# Patient Record
(demographics unavailable — no encounter records)

---

## 2024-11-01 NOTE — ASSESSMENT
[FreeTextEntry1] : R RC tendonitis/impingement. Ice. R SA injection 9/20/24.  Continue PT new rx provided  Mobic daily prn. (does not need renewal)  Recommend pain management for c-spine.  RTO 8 weeks.   .

## 2024-11-01 NOTE — PHYSICAL EXAM
[Right] : right shoulder [5 ___] : forward flexion 5[unfilled]/5 [5___] : internal rotation 5[unfilled]/5 [] : no sensory deficits [FreeTextEntry9] :  ER 40

## 2024-11-01 NOTE — HISTORY OF PRESENT ILLNESS
[Sudden] : sudden [6] : 6 [5] : 5 [Constant] : constant [Sleep] : sleep [Rest] : rest [Meds] : meds [] : yes [de-identified] : 11/1/24: Patient here for follow up.  She notes improvement since the injection at the last visit, however she still notes pain and limitations with adls such as reaching or dressing. She has been in therapy feels it helps and would like to continue.    9/20/24: 63 y/o RHD female presents with right shoulder pain since the end of 7/2024. States she struck the shoulder into a car door. Notes swelling and limited ROM following. She saw ortho at that time, who took x-rays and PT recommended. She has been taking gabapentin and tylenol with mild relief.  [FreeTextEntry1] : RT shoulder  [FreeTextEntry7] : neck and down to fingers  [FreeTextEntry9] : gabapentin [de-identified] : any movement  [de-identified] : orthopedic

## 2024-12-20 NOTE — ASSESSMENT
[FreeTextEntry1] : R RC tendonitis/impingement. Cervical radicular symptoms as well.   Seems more neck today. R SA injection 9/20/24.  New rx for PT today R shoulder and neck.  Mobic 15mg PO daily prn - new rx today.  Recommend pain management for c-spine.  RTO prn for R shoulder.    .

## 2024-12-20 NOTE — HISTORY OF PRESENT ILLNESS
[Sudden] : sudden [6] : 6 [5] : 5 [Constant] : constant [Sleep] : sleep [Rest] : rest [Meds] : meds [] : yes [de-identified] : 12/20/24:  Patient states she is no longer in therapy, pain is frequent but managed with medications.  Pain mostly into neck today.  Reports to tightness.   11/1/24: Patient here for follow up.  She notes improvement since the injection at the last visit, however she still notes pain and limitations with adls such as reaching or dressing. She has been in therapy feels it helps and would like to continue.    9/20/24: 63 y/o RHD female presents with right shoulder pain since the end of 7/2024. States she struck the shoulder into a car door. Notes swelling and limited ROM following. She saw ortho at that time, who took x-rays and PT recommended. She has been taking gabapentin and tylenol with mild relief.  [FreeTextEntry1] : RT shoulder  [FreeTextEntry7] : neck and down to fingers  [FreeTextEntry9] : gabapentin [de-identified] : any movement  [de-identified] : orthopedic

## 2024-12-20 NOTE — PHYSICAL EXAM
[Right] : right shoulder [5 ___] : forward flexion 5[unfilled]/5 [5___] : internal rotation 5[unfilled]/5 [] : no sensory deficits [FreeTextEntry8] : Seems more neck related today [FreeTextEntry9] :  ER 50
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
## 2025-01-10 NOTE — PHYSICAL EXAM
[de-identified] : Constitutional; Appears well, no apparent distress Ability to communicate: Normal  Respiratory: non-labored breathing Skin: No rash noted Head: Normocephalic, atraumatic Neck: no visible thyroid enlargement Eyes: Extraocular movements intact Neurologic: Alert and oriented x3 Psychiatric: normal mood, affect and behavior [Rotation to right] : rotation to right [] : positive [place]

## 2025-01-10 NOTE — DISCUSSION/SUMMARY
[de-identified] : After discussing various treatment options with the patient including but not limited to oral medications, physical therapy, exercise modalities as well as interventional spinal injections, we have decided with the following plan:   - Continue Home exercises, stretching, activity modification, physical therapy, and conservative care. - MRI report and/or images was reviewed and discussed with the patient. - Recommend C7-T1 Cervical Epidural Steroid Injection under fluoroscopic guidance with image. - Of note, the C7-T1 level has been determined to be the safest level to inject in the cervical spine as the epidural space is the largest. Despite pathology at different levels, studies have shown that the medication will spread up to the most cranial level, despite the level of injection. - The risks, benefits and alternatives of the proposed procedure were explained in detail with the patient. The risks outlined include but are not limited to infection, bleeding, post-dural puncture headache, nerve injury, a temporary increase in pain, failure to resolve symptoms, allergic reaction, symptom recurrence, and possible elevation of blood sugar in diabetics. All questions were answered to patient's apparent satisfaction and he/she verbalized an understanding. - Patient is presenting with acute/sub-acute radicular pain with impairment in ADLs and functionality.  The pain has not responded to conservative care including NSAID therapy and/or physical therapy.  There is no bleeding tendency, unstable medical condition, or systemic infection. - Follow up in 1-2 weeks post injection for re-evaluation.